# Patient Record
Sex: FEMALE | Race: OTHER | HISPANIC OR LATINO
[De-identification: names, ages, dates, MRNs, and addresses within clinical notes are randomized per-mention and may not be internally consistent; named-entity substitution may affect disease eponyms.]

---

## 2024-06-06 ENCOUNTER — DASHBOARD ENCOUNTERS (OUTPATIENT)
Age: 34
End: 2024-06-06

## 2024-06-06 ENCOUNTER — OFFICE VISIT (OUTPATIENT)
Dept: URBAN - NONMETROPOLITAN AREA CLINIC 2 | Facility: CLINIC | Age: 34
End: 2024-06-06
Payer: SELF-PAY

## 2024-06-06 ENCOUNTER — LAB OUTSIDE AN ENCOUNTER (OUTPATIENT)
Dept: URBAN - NONMETROPOLITAN AREA CLINIC 2 | Facility: CLINIC | Age: 34
End: 2024-06-06

## 2024-06-06 VITALS
HEART RATE: 70 BPM | WEIGHT: 163.4 LBS | HEIGHT: 62 IN | SYSTOLIC BLOOD PRESSURE: 112 MMHG | TEMPERATURE: 98 F | BODY MASS INDEX: 30.07 KG/M2 | DIASTOLIC BLOOD PRESSURE: 73 MMHG

## 2024-06-06 DIAGNOSIS — K21.9 GASTROESOPHAGEAL REFLUX DISEASE, UNSPECIFIED WHETHER ESOPHAGITIS PRESENT: ICD-10-CM

## 2024-06-06 DIAGNOSIS — K44.9 HIATAL HERNIA: ICD-10-CM

## 2024-06-06 PROBLEM — 235595009: Status: ACTIVE | Noted: 2024-06-06

## 2024-06-06 PROBLEM — 84089009: Status: ACTIVE | Noted: 2024-06-06

## 2024-06-06 PROCEDURE — 99244 OFF/OP CNSLTJ NEW/EST MOD 40: CPT | Performed by: NURSE PRACTITIONER

## 2024-06-06 PROCEDURE — 99204 OFFICE O/P NEW MOD 45 MIN: CPT | Performed by: NURSE PRACTITIONER

## 2024-06-06 RX ORDER — PANTOPRAZOLE 40 MG/1
TAKE 1 TABLET (40 MG) BY ORAL ROUTE ONCE DAILY TABLET, DELAYED RELEASE ORAL ONCE A DAY
Qty: 90 TABLET | Refills: 3 | OUTPATIENT
Start: 2024-06-06

## 2024-06-06 NOTE — HPI-TODAY'S VISIT:
6/6/2024 Lisa is a 33-year-old female who presents for evaluation of heartburn and reflux.  She has been having worsening symptoms since December.  She is been taking Pepcid, Tums, and Sabrina-[place] over-the-counter with recurrent symptoms.  She cannot go to sleep due to the severe symptoms at times.  She does drink 1 [employer] daily.  She denies any significant NSAID use.  She is never had an EGD.  Today she agrees to start high-dose PPI daily for 8 weeks, if no relief consider EGD to rule out EOE.  MB

## 2024-08-19 ENCOUNTER — OFFICE VISIT (OUTPATIENT)
Dept: URBAN - NONMETROPOLITAN AREA SURGERY CENTER 1 | Facility: SURGERY CENTER | Age: 34
End: 2024-08-19

## 2024-09-20 ENCOUNTER — OFFICE VISIT (OUTPATIENT)
Dept: URBAN - NONMETROPOLITAN AREA CLINIC 2 | Facility: CLINIC | Age: 34
End: 2024-09-20

## 2024-10-24 ENCOUNTER — OFFICE VISIT (OUTPATIENT)
Dept: URBAN - NONMETROPOLITAN AREA SURGERY CENTER 1 | Facility: SURGERY CENTER | Age: 34
End: 2024-10-24
Payer: SELF-PAY

## 2024-10-24 ENCOUNTER — CLAIMS CREATED FROM THE CLAIM WINDOW (OUTPATIENT)
Dept: URBAN - METROPOLITAN AREA CLINIC 4 | Facility: CLINIC | Age: 34
End: 2024-10-24
Payer: SELF-PAY

## 2024-10-24 ENCOUNTER — TELEPHONE ENCOUNTER (OUTPATIENT)
Dept: URBAN - NONMETROPOLITAN AREA CLINIC 2 | Facility: CLINIC | Age: 34
End: 2024-10-24

## 2024-10-24 DIAGNOSIS — B96.81 HELICOBACTER PYLORI [H. PYLORI] AS THE CAUSE OF DISEASES CLASSIFIED ELSEWHERE: ICD-10-CM

## 2024-10-24 DIAGNOSIS — K29.60 OTHER GASTRITIS WITHOUT BLEEDING: ICD-10-CM

## 2024-10-24 DIAGNOSIS — K21.9 GASTRO-ESOPHAGEAL REFLUX DISEASE WITHOUT ESOPHAGITIS: ICD-10-CM

## 2024-10-24 DIAGNOSIS — B96.81 BACTERIAL INFECTION DUE TO H. PYLORI: ICD-10-CM

## 2024-10-24 DIAGNOSIS — K21.9 ACID REFLUX: ICD-10-CM

## 2024-10-24 DIAGNOSIS — K20.90 ESOPHAGITIS: ICD-10-CM

## 2024-10-24 DIAGNOSIS — K29.70 GASTRITIS: ICD-10-CM

## 2024-10-24 DIAGNOSIS — K31.89 OTHER DISEASES OF STOMACH AND DUODENUM: ICD-10-CM

## 2024-10-24 PROCEDURE — 88342 IMHCHEM/IMCYTCHM 1ST ANTB: CPT | Performed by: PATHOLOGY

## 2024-10-24 PROCEDURE — 88305 TISSUE EXAM BY PATHOLOGIST: CPT | Performed by: PATHOLOGY

## 2024-10-24 PROCEDURE — 43239 EGD BIOPSY SINGLE/MULTIPLE: CPT | Performed by: INTERNAL MEDICINE

## 2024-10-24 PROCEDURE — 00731 ANES UPR GI NDSC PX NOS: CPT | Performed by: NURSE ANESTHETIST, CERTIFIED REGISTERED

## 2024-10-24 RX ORDER — PANTOPRAZOLE 40 MG/1
TAKE 1 TABLET (40 MG) BY ORAL ROUTE ONCE DAILY TABLET, DELAYED RELEASE ORAL ONCE A DAY
Qty: 90 TABLET | Refills: 3 | Status: ACTIVE | COMMUNITY
Start: 2024-06-06

## 2024-10-24 RX ORDER — PANTOPRAZOLE SODIUM 40 MG/1
1 TABLET TABLET, DELAYED RELEASE ORAL ONCE A DAY
Qty: 90 TABLET | Refills: 3 | OUTPATIENT
Start: 2024-10-24

## 2024-10-25 ENCOUNTER — TELEPHONE ENCOUNTER (OUTPATIENT)
Dept: URBAN - NONMETROPOLITAN AREA CLINIC 2 | Facility: CLINIC | Age: 34
End: 2024-10-25

## 2024-10-27 ENCOUNTER — WEB ENCOUNTER (OUTPATIENT)
Dept: URBAN - NONMETROPOLITAN AREA CLINIC 2 | Facility: CLINIC | Age: 34
End: 2024-10-27

## 2024-10-28 ENCOUNTER — LAB OUTSIDE AN ENCOUNTER (OUTPATIENT)
Dept: URBAN - NONMETROPOLITAN AREA CLINIC 2 | Facility: CLINIC | Age: 34
End: 2024-10-28

## 2024-10-29 ENCOUNTER — WEB ENCOUNTER (OUTPATIENT)
Dept: URBAN - NONMETROPOLITAN AREA CLINIC 2 | Facility: CLINIC | Age: 34
End: 2024-10-29

## 2024-10-30 ENCOUNTER — WEB ENCOUNTER (OUTPATIENT)
Dept: URBAN - NONMETROPOLITAN AREA CLINIC 2 | Facility: CLINIC | Age: 34
End: 2024-10-30

## 2024-11-05 ENCOUNTER — TELEPHONE ENCOUNTER (OUTPATIENT)
Dept: URBAN - NONMETROPOLITAN AREA CLINIC 2 | Facility: CLINIC | Age: 34
End: 2024-11-05

## 2024-11-05 RX ORDER — VONOPRAZAN FUMARATE, AMOXICILLIN AND CLARITHROMYCIN 20-500-500
1 PACKET KIT ORAL TWICE DAILY
Qty: 28 | Refills: 0 | OUTPATIENT
Start: 2024-11-06 | End: 2024-11-20

## 2024-11-07 ENCOUNTER — WEB ENCOUNTER (OUTPATIENT)
Dept: URBAN - NONMETROPOLITAN AREA CLINIC 2 | Facility: CLINIC | Age: 34
End: 2024-11-07

## 2024-11-08 ENCOUNTER — WEB ENCOUNTER (OUTPATIENT)
Dept: URBAN - NONMETROPOLITAN AREA CLINIC 2 | Facility: CLINIC | Age: 34
End: 2024-11-08

## 2024-11-08 RX ORDER — CLARITHROMYCIN 500 MG/1
1 TABLET TABLET, FILM COATED ORAL
Qty: 28 TABLET | Refills: 0 | OUTPATIENT
Start: 2024-11-08 | End: 2024-11-22

## 2024-11-08 RX ORDER — AMOXICILLIN 500 MG/1
2 CAPSULES CAPSULE ORAL
Qty: 56 CAPSULE | Refills: 0 | OUTPATIENT
Start: 2024-11-08 | End: 2024-11-22

## 2024-11-08 RX ORDER — PANTOPRAZOLE SODIUM 40 MG/1
1 TABLET TABLET, DELAYED RELEASE ORAL TWICE DAILY
Qty: 28 | Refills: 0 | OUTPATIENT
Start: 2024-11-08

## 2024-11-21 ENCOUNTER — TELEPHONE ENCOUNTER (OUTPATIENT)
Dept: URBAN - NONMETROPOLITAN AREA CLINIC 2 | Facility: CLINIC | Age: 34
End: 2024-11-21

## 2024-11-21 ENCOUNTER — OFFICE VISIT (OUTPATIENT)
Dept: URBAN - NONMETROPOLITAN AREA CLINIC 2 | Facility: CLINIC | Age: 34
End: 2024-11-21
Payer: SELF-PAY

## 2024-11-21 VITALS
HEART RATE: 76 BPM | HEIGHT: 62 IN | WEIGHT: 167.2 LBS | DIASTOLIC BLOOD PRESSURE: 73 MMHG | BODY MASS INDEX: 30.77 KG/M2 | SYSTOLIC BLOOD PRESSURE: 110 MMHG

## 2024-11-21 DIAGNOSIS — K21.9 GASTROESOPHAGEAL REFLUX DISEASE, UNSPECIFIED WHETHER ESOPHAGITIS PRESENT: ICD-10-CM

## 2024-11-21 DIAGNOSIS — K44.9 HIATAL HERNIA: ICD-10-CM

## 2024-11-21 DIAGNOSIS — A04.8 H. PYLORI INFECTION: ICD-10-CM

## 2024-11-21 PROBLEM — 721730009: Status: ACTIVE | Noted: 2024-11-06

## 2024-11-21 PROCEDURE — 99214 OFFICE O/P EST MOD 30 MIN: CPT | Performed by: NURSE PRACTITIONER

## 2024-11-21 RX ORDER — PANTOPRAZOLE SODIUM 40 MG/1
1 TABLET TABLET, DELAYED RELEASE ORAL ONCE A DAY
Qty: 90 TABLET | Refills: 3 | Status: ACTIVE | COMMUNITY
Start: 2024-10-24

## 2024-11-21 RX ORDER — PANTOPRAZOLE SODIUM 40 MG/1
1 TABLET TABLET, DELAYED RELEASE ORAL TWICE DAILY
Qty: 28 | Refills: 0 | Status: ACTIVE | COMMUNITY
Start: 2024-11-08

## 2024-11-21 RX ORDER — PANTOPRAZOLE 40 MG/1
TAKE 1 TABLET (40 MG) BY ORAL ROUTE ONCE DAILY TABLET, DELAYED RELEASE ORAL ONCE A DAY
Qty: 90 TABLET | Refills: 3 | Status: ACTIVE | COMMUNITY
Start: 2024-06-06

## 2024-11-21 RX ORDER — AMOXICILLIN 500 MG/1
2 CAPSULES CAPSULE ORAL
Qty: 56 CAPSULE | Refills: 0 | Status: ACTIVE | COMMUNITY
Start: 2024-11-08 | End: 2024-11-22

## 2024-11-21 RX ORDER — CLARITHROMYCIN 500 MG/1
1 TABLET TABLET, FILM COATED ORAL
Qty: 28 TABLET | Refills: 0 | Status: ACTIVE | COMMUNITY
Start: 2024-11-08 | End: 2024-11-22

## 2024-11-21 NOTE — HPI-TODAY'S VISIT:
6/6/2024 Lisa is a 33-year-old female who presents for evaluation of heartburn and reflux.  She has been having worsening symptoms since December.  She is been taking Pepcid, Tums, and Sabrina-[place] over-the-counter with recurrent symptoms.  She cannot go to sleep due to the severe symptoms at times.  She does drink 1 [employer] daily.  She denies any significant NSAID use.  She is never had an EGD.  Today she agrees to start high-dose PPI daily for 8 weeks, if no relief consider EGD to rule out EOE.  MB 11/21/2024 Lisa presents for follow-up of H. pylori.  She is completing her Prevpac tomorrow.  She has had diarrhea during antibiotics but this seems to be improving.  Today she agrees to check her stool in 6 weeks.  Her reflux improved significantly on pantoprazole 20 mg daily and she will continue this daily until her follow-up visit.  MB

## 2024-11-24 ENCOUNTER — WEB ENCOUNTER (OUTPATIENT)
Dept: URBAN - NONMETROPOLITAN AREA CLINIC 2 | Facility: CLINIC | Age: 34
End: 2024-11-24

## 2024-11-24 RX ORDER — PANTOPRAZOLE SODIUM 20 MG/1
1 TABLET 1/2 TO 1 HOUR BEFORE MORNING MEAL TABLET, DELAYED RELEASE ORAL ONCE A DAY
Qty: 90 | Refills: 3
Start: 2024-06-06

## 2025-01-02 ENCOUNTER — LAB OUTSIDE AN ENCOUNTER (OUTPATIENT)
Dept: URBAN - NONMETROPOLITAN AREA CLINIC 2 | Facility: CLINIC | Age: 35
End: 2025-01-02

## 2025-01-06 ENCOUNTER — WEB ENCOUNTER (OUTPATIENT)
Dept: URBAN - NONMETROPOLITAN AREA CLINIC 2 | Facility: CLINIC | Age: 35
End: 2025-01-06

## 2025-01-06 LAB — RESULT:: (no result)

## 2025-01-09 ENCOUNTER — WEB ENCOUNTER (OUTPATIENT)
Dept: URBAN - NONMETROPOLITAN AREA CLINIC 2 | Facility: CLINIC | Age: 35
End: 2025-01-09

## 2025-02-27 ENCOUNTER — OFFICE VISIT (OUTPATIENT)
Dept: URBAN - NONMETROPOLITAN AREA CLINIC 2 | Facility: CLINIC | Age: 35
End: 2025-02-27
Payer: COMMERCIAL

## 2025-02-27 VITALS
BODY MASS INDEX: 30.8 KG/M2 | HEART RATE: 64 BPM | WEIGHT: 167.4 LBS | DIASTOLIC BLOOD PRESSURE: 75 MMHG | SYSTOLIC BLOOD PRESSURE: 110 MMHG | HEIGHT: 62 IN | TEMPERATURE: 98 F

## 2025-02-27 DIAGNOSIS — A04.8 H. PYLORI INFECTION: ICD-10-CM

## 2025-02-27 DIAGNOSIS — K44.9 HIATAL HERNIA: ICD-10-CM

## 2025-02-27 DIAGNOSIS — K21.9 GASTROESOPHAGEAL REFLUX DISEASE, UNSPECIFIED WHETHER ESOPHAGITIS PRESENT: ICD-10-CM

## 2025-02-27 PROCEDURE — 99214 OFFICE O/P EST MOD 30 MIN: CPT | Performed by: NURSE PRACTITIONER

## 2025-02-27 RX ORDER — PANTOPRAZOLE SODIUM 40 MG/1
1 TABLET TABLET, DELAYED RELEASE ORAL ONCE A DAY
Qty: 90 TABLET | Refills: 3 | Status: ACTIVE | COMMUNITY
Start: 2024-10-24

## 2025-02-27 RX ORDER — FAMOTIDINE 20 MG/1
TAKE 1 TABLET TABLET, FILM COATED ORAL ONCE A DAY
Qty: 90 | Refills: 3 | OUTPATIENT
Start: 2025-02-27

## 2025-02-27 NOTE — HPI-TODAY'S VISIT:
6/6/2024 Lisa is a 33-year-old female who presents for evaluation of heartburn and reflux.  She has been having worsening symptoms since December.  She is been taking Pepcid, Tums, and Sabrina-[place] over-the-counter with recurrent symptoms.  She cannot go to sleep due to the severe symptoms at times.  She does drink 1 [employer] daily.  She denies any significant NSAID use.  She is never had an EGD.  Today she agrees to start high-dose PPI daily for 8 weeks, if no relief consider EGD to rule out EOE.  MB 11/21/2024 Lisa presents for follow-up of H. pylori.  She is completing her Prevpac tomorrow.  She has had diarrhea during antibiotics but this seems to be improving.  Today she agrees to check her stool in 6 weeks.  Her reflux improved significantly on pantoprazole 20 mg daily and she will continue this daily until her follow-up visit.  MB 2/27/2025 Lisa presents for follow-up.  Since her last visit she status post Prevpac.  Her H. pylori stool was negative.  She discontinued PPI abruptly and has had increased gastritis symptoms with nausea lack of appetite and heartburn.  Today we have discussed repeating an H. pylori breath test as she is concerned about a recurrence, if negative would start famotidine 20 mg daily then she can taper off to as needed.  MB